# Patient Record
Sex: MALE | Race: WHITE | URBAN - METROPOLITAN AREA
[De-identification: names, ages, dates, MRNs, and addresses within clinical notes are randomized per-mention and may not be internally consistent; named-entity substitution may affect disease eponyms.]

---

## 2023-05-16 ENCOUNTER — OFFICE VISIT (OUTPATIENT)
Dept: URBAN - METROPOLITAN AREA CLINIC 85 | Facility: CLINIC | Age: 78
End: 2023-05-16
Payer: MEDICARE

## 2023-05-16 DIAGNOSIS — H04.563 STENOSIS OF BILATERAL LACRIMAL PUNCTUM: ICD-10-CM

## 2023-05-16 DIAGNOSIS — H02.132 SENILE ECTROPION OF RIGHT LOWER LID: Primary | ICD-10-CM

## 2023-05-16 DIAGNOSIS — H25.13 AGE-RELATED NUCLEAR CATARACT, BILATERAL: ICD-10-CM

## 2023-05-16 DIAGNOSIS — H02.135 SENILE ECTROPION OF LEFT LOWER LID: ICD-10-CM

## 2023-05-16 DIAGNOSIS — H04.123 DRY EYE SYNDROME OF BILATERAL LACRIMAL GLANDS: ICD-10-CM

## 2023-05-16 PROCEDURE — 92004 COMPRE OPH EXAM NEW PT 1/>: CPT | Performed by: OPHTHALMOLOGY

## 2023-05-16 RX ORDER — KETOROLAC TROMETHAMINE 5 MG/ML
0.5 % SOLUTION OPHTHALMIC
Qty: 1 | Refills: 1 | Status: ACTIVE
Start: 2023-05-16

## 2023-05-16 ASSESSMENT — INTRAOCULAR PRESSURE
OD: 10
OS: 11

## 2023-05-16 ASSESSMENT — VISUAL ACUITY
OD: 20/25
OS: 20/20

## 2023-05-16 ASSESSMENT — KERATOMETRY
OD: 44.88
OS: 44.63

## 2023-05-16 NOTE — IMPRESSION/PLAN
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 Plan: Discussed findings with patient. Discussed potential surgical intervention  in hopes of improving symptoms. Recommend punctal dilation with 2 snip OU if desired.

## 2023-05-16 NOTE — IMPRESSION/PLAN
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 Plan: Dicussed potential surgical intervention with oculoplastic surgeon. Initiate Ketorolac OD BID. Discussed risk, benefits and alternatives with patient. Consult with Dr. Sam Manjarrez  when pt. desires.

## 2023-05-16 NOTE — IMPRESSION/PLAN
Impression: Dry eye syndrome of bilateral lacrimal glands: H04.123. Plan: Discussed dry eye diagnosis in detail. Recommend Systane Complete QID OU. Discussed prescription medication options such as Restasis and Bing Timmy in the future. Also discussed potential of punctal plugs/punctal cautery.

## 2023-05-16 NOTE — IMPRESSION/PLAN
Impression: Age-related nuclear cataract, bilateral: H25.13. Plan: Discussed findings. Discussed option of CE/IOL OU. Patient understands cataract effect on vision. Patient defers to have  CE w/IOL consult with  Dr. Marciano Yepez at this time. Continue to monitor. RTC 1 year CEE or ASAP if decreased VA or pain.

## 2023-06-06 ENCOUNTER — PROCEDURE (OUTPATIENT)
Dept: URBAN - METROPOLITAN AREA CLINIC 85 | Facility: CLINIC | Age: 78
End: 2023-06-06
Payer: MEDICARE

## 2023-06-06 DIAGNOSIS — H04.563 STENOSIS OF BILATERAL LACRIMAL PUNCTUM: Primary | ICD-10-CM

## 2023-06-06 PROCEDURE — 92012 INTRM OPH EXAM EST PATIENT: CPT | Performed by: OPHTHALMOLOGY

## 2023-06-06 NOTE — IMPRESSION/PLAN
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 Plan: Patient presents for punctal dilation with irrigation and possible 2-snip procedure which was unsuccessful due to complete closure of right lower punctum. Patient will be referred to Dr. Linn Naqvi or Dr. Wojciech Walls for lid consult.

## 2023-06-27 ENCOUNTER — OFFICE VISIT (OUTPATIENT)
Dept: URBAN - METROPOLITAN AREA CLINIC 85 | Facility: CLINIC | Age: 78
End: 2023-06-27
Payer: MEDICARE

## 2023-06-27 DIAGNOSIS — H02.135 SENILE ECTROPION OF LEFT LOWER LID: ICD-10-CM

## 2023-06-27 DIAGNOSIS — H04.563 STENOSIS OF BILATERAL LACRIMAL PUNCTUM: ICD-10-CM

## 2023-06-27 DIAGNOSIS — H02.132 SENILE ECTROPION OF RIGHT LOWER LID: Primary | ICD-10-CM

## 2023-06-27 PROCEDURE — 99213 OFFICE O/P EST LOW 20 MIN: CPT | Performed by: OPHTHALMOLOGY

## 2023-06-27 RX ORDER — ERYTHROMYCIN 5 MG/G
OINTMENT OPHTHALMIC
Qty: 1 | Refills: 2 | Status: ACTIVE
Start: 2023-06-27

## 2023-06-27 ASSESSMENT — INTRAOCULAR PRESSURE
OD: 11
OS: 11

## 2023-06-27 NOTE — IMPRESSION/PLAN
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 Plan: Discussed diagnosis in detail with patient. Advised patient of condition. Discussed treatment options with patient. Discussed risks, benefits and alternatives  of surgical intervention and patient understands. Reassured patient of current condition and treatment. OK to proceed with  Ectropion repair with tarsal plate,  BLL with 3 snip procedure for BLL.

## 2023-07-11 ENCOUNTER — ADULT PHYSICAL (OUTPATIENT)
Dept: URBAN - METROPOLITAN AREA CLINIC 85 | Facility: CLINIC | Age: 78
End: 2023-07-11
Payer: MEDICARE

## 2023-07-11 DIAGNOSIS — H02.132 SENILE ECTROPION OF RIGHT LOWER EYELID: ICD-10-CM

## 2023-07-11 DIAGNOSIS — H02.135 SENILE ECTROPION OF LEFT LOWER EYELID: ICD-10-CM

## 2023-07-11 DIAGNOSIS — Z01.818 ENCOUNTER FOR OTHER PREPROCEDURAL EXAMINATION: Primary | ICD-10-CM

## 2023-07-11 PROCEDURE — 99203 OFFICE O/P NEW LOW 30 MIN: CPT | Performed by: NURSE PRACTITIONER

## 2023-07-17 ENCOUNTER — SURGERY (OUTPATIENT)
Dept: URBAN - METROPOLITAN AREA SURGERY 55 | Facility: SURGERY | Age: 78
End: 2023-07-17
Payer: MEDICARE

## 2023-08-01 ENCOUNTER — POST-OPERATIVE VISIT (OUTPATIENT)
Dept: URBAN - METROPOLITAN AREA CLINIC 85 | Facility: CLINIC | Age: 78
End: 2023-08-01
Payer: MEDICARE

## 2023-08-01 DIAGNOSIS — Z48.89 ENCOUNTER FOR OTHER SPECIFIED SURGICAL AFTERCARE: Primary | ICD-10-CM

## 2023-08-01 ASSESSMENT — INTRAOCULAR PRESSURE
OS: 11
OD: 11